# Patient Record
Sex: MALE | Race: WHITE | Employment: FULL TIME | URBAN - NONMETROPOLITAN AREA
[De-identification: names, ages, dates, MRNs, and addresses within clinical notes are randomized per-mention and may not be internally consistent; named-entity substitution may affect disease eponyms.]

---

## 2020-03-31 ENCOUNTER — HOSPITAL ENCOUNTER (EMERGENCY)
Age: 30
Discharge: HOME | End: 2020-03-31
Attending: EMERGENCY MEDICINE
Payer: COMMERCIAL

## 2020-03-31 ENCOUNTER — APPOINTMENT (OUTPATIENT)
Dept: CT IMAGING | Age: 30
End: 2020-03-31
Payer: COMMERCIAL

## 2020-03-31 VITALS
DIASTOLIC BLOOD PRESSURE: 86 MMHG | HEIGHT: 69 IN | TEMPERATURE: 98.3 F | BODY MASS INDEX: 30.51 KG/M2 | SYSTOLIC BLOOD PRESSURE: 144 MMHG | WEIGHT: 206 LBS | RESPIRATION RATE: 16 BRPM | OXYGEN SATURATION: 96 % | HEART RATE: 82 BPM

## 2020-03-31 LAB
A/G RATIO: 1.4 (ref 1.1–2.2)
ALBUMIN SERPL-MCNC: 4.8 G/DL (ref 3.4–5)
ALP BLD-CCNC: 60 U/L (ref 40–129)
ALT SERPL-CCNC: 38 U/L (ref 10–40)
AMORPHOUS: ABNORMAL /HPF
ANION GAP SERPL CALCULATED.3IONS-SCNC: 15 MMOL/L (ref 3–16)
AST SERPL-CCNC: 28 U/L (ref 15–37)
BACTERIA: ABNORMAL /HPF
BASOPHILS ABSOLUTE: 0.1 K/UL (ref 0–0.2)
BASOPHILS RELATIVE PERCENT: 0.3 %
BILIRUB SERPL-MCNC: 0.4 MG/DL (ref 0–1)
BILIRUBIN URINE: NEGATIVE
BLOOD, URINE: ABNORMAL
BUN BLDV-MCNC: 17 MG/DL (ref 7–20)
CALCIUM SERPL-MCNC: 10 MG/DL (ref 8.3–10.6)
CHLORIDE BLD-SCNC: 102 MMOL/L (ref 99–110)
CLARITY: CLEAR
CO2: 23 MMOL/L (ref 21–32)
COLOR: YELLOW
CREAT SERPL-MCNC: 1.1 MG/DL (ref 0.9–1.3)
EOSINOPHILS ABSOLUTE: 0.3 K/UL (ref 0–0.6)
EOSINOPHILS RELATIVE PERCENT: 1.4 %
EPITHELIAL CELLS, UA: ABNORMAL /HPF (ref 0–5)
GFR AFRICAN AMERICAN: >60
GFR NON-AFRICAN AMERICAN: >60
GLOBULIN: 3.5 G/DL
GLUCOSE BLD-MCNC: 168 MG/DL (ref 70–99)
GLUCOSE URINE: NEGATIVE MG/DL
HCT VFR BLD CALC: 45.9 % (ref 40.5–52.5)
HEMOGLOBIN: 15.6 G/DL (ref 13.5–17.5)
KETONES, URINE: 15 MG/DL
LEUKOCYTE ESTERASE, URINE: NEGATIVE
LYMPHOCYTES ABSOLUTE: 2.2 K/UL (ref 1–5.1)
LYMPHOCYTES RELATIVE PERCENT: 12.2 %
MCH RBC QN AUTO: 29.9 PG (ref 26–34)
MCHC RBC AUTO-ENTMCNC: 34 G/DL (ref 31–36)
MCV RBC AUTO: 88 FL (ref 80–100)
MICROSCOPIC EXAMINATION: YES
MONOCYTES ABSOLUTE: 1 K/UL (ref 0–1.3)
MONOCYTES RELATIVE PERCENT: 5.6 %
MUCUS: ABNORMAL /LPF
NEUTROPHILS ABSOLUTE: 14.3 K/UL (ref 1.7–7.7)
NEUTROPHILS RELATIVE PERCENT: 80.5 %
NITRITE, URINE: NEGATIVE
PDW BLD-RTO: 13.2 % (ref 12.4–15.4)
PH UA: 5 (ref 5–8)
PLATELET # BLD: 330 K/UL (ref 135–450)
PMV BLD AUTO: 7.2 FL (ref 5–10.5)
POTASSIUM SERPL-SCNC: 3.7 MMOL/L (ref 3.5–5.1)
PROTEIN UA: NEGATIVE MG/DL
RBC # BLD: 5.22 M/UL (ref 4.2–5.9)
RBC UA: ABNORMAL /HPF (ref 0–4)
SODIUM BLD-SCNC: 140 MMOL/L (ref 136–145)
SPECIFIC GRAVITY UA: >=1.03 (ref 1–1.03)
TOTAL PROTEIN: 8.3 G/DL (ref 6.4–8.2)
URINE TYPE: ABNORMAL
UROBILINOGEN, URINE: 0.2 E.U./DL
WBC # BLD: 17.8 K/UL (ref 4–11)
WBC UA: ABNORMAL /HPF (ref 0–5)

## 2020-03-31 PROCEDURE — 81001 URINALYSIS AUTO W/SCOPE: CPT

## 2020-03-31 PROCEDURE — 85025 COMPLETE CBC W/AUTO DIFF WBC: CPT

## 2020-03-31 PROCEDURE — 96374 THER/PROPH/DIAG INJ IV PUSH: CPT

## 2020-03-31 PROCEDURE — 2580000003 HC RX 258: Performed by: EMERGENCY MEDICINE

## 2020-03-31 PROCEDURE — 74176 CT ABD & PELVIS W/O CONTRAST: CPT

## 2020-03-31 PROCEDURE — 99284 EMERGENCY DEPT VISIT MOD MDM: CPT

## 2020-03-31 PROCEDURE — 6360000002 HC RX W HCPCS: Performed by: EMERGENCY MEDICINE

## 2020-03-31 PROCEDURE — 80053 COMPREHEN METABOLIC PANEL: CPT

## 2020-03-31 PROCEDURE — 96375 TX/PRO/DX INJ NEW DRUG ADDON: CPT

## 2020-03-31 PROCEDURE — 6370000000 HC RX 637 (ALT 250 FOR IP): Performed by: EMERGENCY MEDICINE

## 2020-03-31 PROCEDURE — 36415 COLL VENOUS BLD VENIPUNCTURE: CPT

## 2020-03-31 RX ORDER — 0.9 % SODIUM CHLORIDE 0.9 %
1000 INTRAVENOUS SOLUTION INTRAVENOUS ONCE
Status: COMPLETED | OUTPATIENT
Start: 2020-03-31 | End: 2020-03-31

## 2020-03-31 RX ORDER — TAMSULOSIN HYDROCHLORIDE 0.4 MG/1
0.4 CAPSULE ORAL ONCE
Status: COMPLETED | OUTPATIENT
Start: 2020-03-31 | End: 2020-03-31

## 2020-03-31 RX ORDER — IBUPROFEN 600 MG/1
600 TABLET ORAL EVERY 6 HOURS PRN
Qty: 28 TABLET | Refills: 0 | Status: SHIPPED | OUTPATIENT
Start: 2020-03-31 | End: 2020-04-07

## 2020-03-31 RX ORDER — HYDROCODONE BITARTRATE AND ACETAMINOPHEN 5; 325 MG/1; MG/1
1 TABLET ORAL EVERY 6 HOURS PRN
Qty: 5 TABLET | Refills: 0 | Status: SHIPPED | OUTPATIENT
Start: 2020-03-31 | End: 2020-04-02

## 2020-03-31 RX ORDER — ACETAMINOPHEN 500 MG
500 TABLET ORAL EVERY 6 HOURS PRN
Qty: 20 TABLET | Refills: 0 | Status: SHIPPED | OUTPATIENT
Start: 2020-03-31

## 2020-03-31 RX ORDER — SODIUM CHLORIDE 9 MG/ML
INJECTION, SOLUTION INTRAVENOUS CONTINUOUS
Status: DISCONTINUED | OUTPATIENT
Start: 2020-03-31 | End: 2020-03-31 | Stop reason: HOSPADM

## 2020-03-31 RX ORDER — ONDANSETRON 2 MG/ML
4 INJECTION INTRAMUSCULAR; INTRAVENOUS ONCE
Status: COMPLETED | OUTPATIENT
Start: 2020-03-31 | End: 2020-03-31

## 2020-03-31 RX ORDER — ONDANSETRON 4 MG/1
4 TABLET, ORALLY DISINTEGRATING ORAL EVERY 8 HOURS PRN
Qty: 20 TABLET | Refills: 0 | Status: SHIPPED | OUTPATIENT
Start: 2020-03-31 | End: 2020-04-07

## 2020-03-31 RX ORDER — KETOROLAC TROMETHAMINE 30 MG/ML
30 INJECTION, SOLUTION INTRAMUSCULAR; INTRAVENOUS ONCE
Status: COMPLETED | OUTPATIENT
Start: 2020-03-31 | End: 2020-03-31

## 2020-03-31 RX ORDER — TAMSULOSIN HYDROCHLORIDE 0.4 MG/1
0.4 CAPSULE ORAL DAILY
Qty: 5 CAPSULE | Refills: 0 | Status: SHIPPED | OUTPATIENT
Start: 2020-03-31 | End: 2020-04-05

## 2020-03-31 RX ADMIN — HYDROMORPHONE HYDROCHLORIDE 1 MG: 1 INJECTION, SOLUTION INTRAMUSCULAR; INTRAVENOUS; SUBCUTANEOUS at 08:29

## 2020-03-31 RX ADMIN — KETOROLAC TROMETHAMINE 30 MG: 30 INJECTION, SOLUTION INTRAMUSCULAR at 08:34

## 2020-03-31 RX ADMIN — ONDANSETRON HYDROCHLORIDE 4 MG: 2 INJECTION, SOLUTION INTRAMUSCULAR; INTRAVENOUS at 08:29

## 2020-03-31 RX ADMIN — TAMSULOSIN HYDROCHLORIDE 0.4 MG: 0.4 CAPSULE ORAL at 08:43

## 2020-03-31 RX ADMIN — SODIUM CHLORIDE 1000 ML: 9 INJECTION, SOLUTION INTRAVENOUS at 08:29

## 2020-03-31 SDOH — HEALTH STABILITY: MENTAL HEALTH: HOW OFTEN DO YOU HAVE A DRINK CONTAINING ALCOHOL?: NEVER

## 2020-03-31 ASSESSMENT — PAIN SCALES - GENERAL
PAINLEVEL_OUTOF10: 10
PAINLEVEL_OUTOF10: 8
PAINLEVEL_OUTOF10: 8

## 2020-03-31 ASSESSMENT — ENCOUNTER SYMPTOMS
ABDOMINAL PAIN: 0
BACK PAIN: 0
SHORTNESS OF BREATH: 0

## 2020-03-31 ASSESSMENT — PAIN DESCRIPTION - PAIN TYPE: TYPE: ACUTE PAIN

## 2020-03-31 ASSESSMENT — PAIN DESCRIPTION - ORIENTATION: ORIENTATION: MID;LOWER

## 2020-03-31 ASSESSMENT — PAIN DESCRIPTION - DESCRIPTORS: DESCRIPTORS: STABBING

## 2020-03-31 ASSESSMENT — PAIN DESCRIPTION - LOCATION: LOCATION: ABDOMEN;PELVIS

## 2020-03-31 NOTE — ED PROVIDER NOTES
[address] [facility] Name: Geno Rick  MRN: [id number]  [place] 1990  Date of evaluation: 3/31/2020  Provider: Shayan Newton MD    [address] [place] [address]       Chief Complaint   Patient presents with    Flank Pain     since 0515         HISTORY OF PRESENT ILLNESS   (Location/Symptom, Timing/Onset, Context/Setting, Quality, Duration, Modifying Factors, Severity)  Note limiting factors. Geno Rick is a 26 y.o. male who presents to the emergency department     Patient was suddenly awakened at 5 AM with severe right flank pain. Patient states that he is nauseated. He is never had pain like this before it is 10/10 it is into the right flank radiating around to the right groin. He has apparently father had some kidney stones but he himself is never had one. He denies other medical problems including blood thinners etc.  He denies any other history he has no tenderness. The history is provided by the patient. Abdominal Pain   Pain location:  R flank  Pain quality: sharp and stabbing    Pain radiates to:  R flank  Onset quality:  Sudden  Timing:  Constant  Progression:  Worsening  Chronicity:  New  Context: awakening from sleep    Context: not previous surgeries    Associated symptoms: no chest pain, no chills, no fever, no hematuria and no shortness of breath        Nursing Notes were reviewed. REVIEW OF SYSTEMS    (2-9 systems for level 4, 10 or more for level 5)     Review of Systems   Constitutional: Positive for activity change. Negative for chills and fever. Respiratory: Negative for shortness of breath. Cardiovascular: Negative for chest pain, palpitations and leg swelling. Gastrointestinal: Negative for abdominal pain. Genitourinary: Positive for difficulty urinating and flank pain. Negative for decreased urine volume, frequency, hematuria, testicular pain and urgency.    Musculoskeletal: Negative for back pain and neck MEDICATIONS:  New Prescriptions    ACETAMINOPHEN (APAP EXTRA STRENGTH) 500 MG TABLET    Take 1 tablet by mouth every 6 hours as needed for Pain    HYDROCODONE-ACETAMINOPHEN (NORCO) 5-325 MG PER TABLET    Take 1 tablet by mouth every 6 hours as needed for Pain for up to 5 doses. IBUPROFEN (ADVIL;MOTRIN) 600 MG TABLET    Take 1 tablet by mouth every 6 hours as needed for Pain    ONDANSETRON (ZOFRAN-ODT) 4 MG DISINTEGRATING TABLET    Place 1 tablet under the tongue every 8 hours as needed for Nausea or Vomiting May Sub regular tablet (non-ODT) if insurance does not cover ODT. TAMSULOSIN (FLOMAX) 0.4 MG CAPSULE    Take 1 capsule by mouth daily for 5 days       Controlled Substances Monitoring  No flowsheet data found. (Please note that portions of this note were completed with a voice recognition program.  Efforts were made to edit the dictations but occasionally words are mis-transcribed.)    Patient was advised to return to the Emergency Department if there was any worsening.     Nia Grubbs MD (electronically signed)  Attending Emergency Physician         Brittany Luna MD  03/31/20 2251

## 2023-12-31 ENCOUNTER — HOSPITAL ENCOUNTER (EMERGENCY)
Age: 33
Discharge: HOME | End: 2023-12-31
Payer: COMMERCIAL

## 2023-12-31 VITALS — BODY MASS INDEX: 33.6 KG/M2

## 2023-12-31 VITALS
HEART RATE: 74 BPM | OXYGEN SATURATION: 98 % | RESPIRATION RATE: 18 BRPM | TEMPERATURE: 97.6 F | DIASTOLIC BLOOD PRESSURE: 96 MMHG | SYSTOLIC BLOOD PRESSURE: 141 MMHG

## 2023-12-31 DIAGNOSIS — R10.13: Primary | ICD-10-CM

## 2023-12-31 LAB
ALANINE AMINOTRANSFER ALT/SGPT: 67 U/L (ref 16–61)
ALBUMIN SERPL-MCNC: 3.6 G/DL (ref 3.2–5)
ALKALINE PHOSPHATASE: 56 U/L (ref 45–117)
ANION GAP: 3 (ref 5–15)
AST(SGOT): 31 U/L (ref 15–37)
BILIRUB DIRECT SERPL-MCNC: 0.08 MG/DL (ref 0–0.3)
BUN SERPL-MCNC: 12 MG/DL (ref 7–18)
BUN/CREAT RATIO: 12.1 RATIO (ref 10–20)
CALCIUM SERPL-MCNC: 8.9 MG/DL (ref 8.5–10.1)
CARBON DIOXIDE: 25 MMOL/L (ref 21–32)
CHLORIDE: 111 MMOL/L (ref 98–107)
DEPRECATED RDW RBC: 41.5 FL (ref 35.1–43.9)
ERYTHROCYTE [DISTWIDTH] IN BLOOD: 13.2 % (ref 11.6–14.6)
EST GLOM FILT RATE - AFR AMER: 111 ML/MIN (ref 60–?)
ESTIMATED CREATININE CLEARANCE: 105.14 ML/MIN
GLOBULIN: 3.9 G/DL (ref 2.2–4.2)
GLUCOSE: 97 MG/DL (ref 74–106)
HCT VFR BLD AUTO: 45.7 % (ref 40–54)
HGB BLD-MCNC: 16 G/DL (ref 13–16.5)
IMMATURE GRANULOCYTES COUNT: 0.02 X10^3/UL (ref 0–0)
LIPASE: 30 U/L (ref 13–75)
MCV RBC: 86.6 FL (ref 80–94)
MEAN CORP HGB CONC: 35 G/DL (ref 32–36)
MEAN PLATELET VOL.: 8.9 FL (ref 6.2–12)
NRBC FLAGGED BY ANALYZER: 0 % (ref 0–5)
PLATELET # BLD: 301 K/MM3 (ref 150–450)
POTASSIUM: 4.1 MMOL/L (ref 3.5–5.1)
RBC # BLD AUTO: 5.28 M/MM3 (ref 4.6–6.2)
TROPONIN-I HS: 4 PG/ML (ref 3–78)
WBC # BLD AUTO: 7.9 K/MM3 (ref 4.4–11)

## 2023-12-31 PROCEDURE — 93005 ELECTROCARDIOGRAM TRACING: CPT

## 2023-12-31 PROCEDURE — A4216 STERILE WATER/SALINE, 10 ML: HCPCS

## 2023-12-31 PROCEDURE — 85025 COMPLETE CBC W/AUTO DIFF WBC: CPT

## 2023-12-31 PROCEDURE — 96365 THER/PROPH/DIAG IV INF INIT: CPT

## 2023-12-31 PROCEDURE — 80076 HEPATIC FUNCTION PANEL: CPT

## 2023-12-31 PROCEDURE — 80048 BASIC METABOLIC PNL TOTAL CA: CPT

## 2023-12-31 PROCEDURE — 99284 EMERGENCY DEPT VISIT MOD MDM: CPT

## 2023-12-31 PROCEDURE — 84484 ASSAY OF TROPONIN QUANT: CPT

## 2023-12-31 PROCEDURE — 83690 ASSAY OF LIPASE: CPT

## 2023-12-31 NOTE — XMS RPT_ITS
Comprehensive CCD (C-CDA v2.1)  
  
                          Created on: 2023  
  
  
Hamzah Trevizo  
External Reference #: [id number]  
: 1990  
Sex: Male  
  
Demographics  
  
  
                                        Address             [address] [place]  [address]  
   
                                        Home Phone          [phone]  
   
                                        Home Phone          [phone]  
   
                                        Preferred Language  en  
   
                                        Marital Status        
   
                                        Denominational Affiliation Unknown  
   
                                        Race                Unknown  
   
                                        Ethnic Group        Not  or Lati  
no  
  
  
Author  
  
  
                                        Name                Unknown  
   
                                        Address             [address]  
[address]  
[place]  [address]  
   
                                        Phone               [phone]  
   
                                        Organization        [facility]  
  
  
Care Team Providers  
  
  
                                Care Team Member Name Role            Phone  
   
                                Naomi Moore Primary Care Provider [phone]  
[phone]  
   
                                MD Naomi Moore Attending Provider [phone]  
   
                                MD Naomi Moore Primary Care Provider [phone]   
   
                                AMRITA Ryder Attending Provider [phone]  
[phone]  
   
                                AMRITA Ryder Other Provider  [phone]  
   
                                MD Alex Payne Attending Provider [phone]  
   
                                MARILYN Lopez Attending Provider [phone]  
   
                                MD Naomi Moore Primary Care Provider [phone]   
   
                                AMRITA Ryder Attending Provider [phone]  
[phone]  
   
                                MARILYN Lopez Attending Provider [phone]  
   
                                MARY Gutierrez Attending Provider [phone]  
   
                                July, MARILYN Goodson Attending Provider [phone]  
   
                                MD Naomi Moore Primary Care Provider [phone]   
   
                                July, MARILYN Goodson Attending Provider [phone]  
   
                                MD Naomi Moore Attending Provider [phone]  
   
                                July, Hamzah   Attending       Unavailable  
   
                                Naomi Moore Attending       Unavailable  
   
                                Naomi Moore Attending       Unavailable  
  
  
  
Allergies  
  
  
                                                    Allergy   
Classification                          Reported   
Allergen(s)               Allergy Type              Date of   
Onset                     Reaction(s)               Facility  
   
                                                      
(2 sources)               Penicillins               Propensity to   
adverse   
reactions to   
drug                                      
0                         Rash                      [place]  
   
                                                      
(3 sources)               Penicillins               Allergy to   
substance                                 
3                         Rash                      [facility]  
   
                                                      
(1 source)                Penicillins               Drug allergy   
(disorder)                              10-  
3                                                   [facility]   
[facility]   
Repository  
  
  
  
Medications  
Current Medications  
  
  
  
                      Medication Drug Class(es) Dates      Sig (Normalized) Sig   
(Original)  
   
                                                    acetaminophen 500 mg   
oral tablet  
(1 source)                                          Start:   
2020                              take 1 tablet by   
mouth every six   
hours as needed for   
pain                                    acetaminophen (APAP   
EXTRA STRENGTH) 500   
MG tablet Take 1   
tablet by mouth   
every 6 hours as   
needed for Pain 20   
tablet 0 2020   
Active  
  
  
  
                                          
   
                                          
   
                                          
   
                                          
   
                                          
   
                                          
   
                                          
   
                                          
   
                                          
   
                                          
   
                                          
   
                                          
   
                                          
   
                                          
   
                                          
   
                                          
   
                                          
   
                                          
   
                                          
   
                                          
   
                                          
   
                                          
   
                                          
   
                                          
  
  
  
Completed/Discontinued Medications  
  
  
  
                      Medication Drug Class(es) Dates      Sig (Normalized) Sig   
(Original)  
   
                                                    12 hr cetirizine   
hydrochloride 5 mg /   
pseudoephedrine   
hydrochloride 120 mg   
extended release   
oral tablet  
(3 sources)                             alpha-Adrenergic   
Agonist,   
Histamine-1   
Receptor Antagonist                     Start:   
2023  
End:   
10-                              take 1 tablet by   
mouth once, then   
take 1 tablet by   
mouth every   
twelve hours                            Cetirizine-Pseudoep  
hedrine (Zyrtec-D)   
5-120 mg tablet   
extended release 12   
hr Discontinued 1   
TAB ORAL ONCE    
1:00am 2023 8:23am  
   
                                                    cyclobenzaprine   
hydrochloride 10 mg   
oral tablet  
(4 sources)               Muscle Relaxant           Start:   
2022  
End:   
2023                              take 10 mg by   
mouth every eight   
hours                                   Cyclobenzaprine   
Discontinued 10 MG   
ORAL Q8H 20 May   
27th, 2022 12:00am   
2023   
9:32am  
   
                                                    dexamethasone 1   
mg/ml / neomycin 3.5   
mg/ml / polymyxin b   
85864 unt/ml   
ophthalmic   
suspension  
(3 sources)                             Aminoglycoside   
Antibacterial,   
Polymyxin-class   
Antibacterial,   
Corticosteroid                          Start:   
2022  
End:   
2023                              take 1 drop(s)   
into the eye(s)   
every twelve   
hours                                   Neomycin-Polymyxin   
B-Dexameth   
Discontinued 1 DROP   
EYE-RIGHT Q12H 5 7   
2022   
12:00am 2023 11:48am  
   
                                                    doxycycline hyclate   
100 mg oral tablet  
(4 sources)                             Tetracycline-class   
Drug                                    Start:   
2022  
End:   
2022                              take 100 mg by   
mouth twice daily                       Doxycycline   
Monohydrate   
Discontinued 100 MG   
ORAL TWICE A DAY 14   
7 2022   
5:33pm May 27th,   
2022 8:15am  
  
  
  
                                          
   
                                          
   
                                          
   
                                          
   
                                          
   
                                          
   
                                          
   
                                          
   
                                          
   
                                          
  
  
  
Problems  
Active Problems  
  
  
                                                    Problem   
Classification  Problem         Date            Documented Date Episodic/Chronic  
   
                                                    Esophageal disorders  
(6 sources)                             Gastroesophageal   
reflux disease;   
Translations:   
[Gastro-esophageal   
reflux disease without   
esophagitis]                            10-          Chronic  
   
                                                    Inflammation;   
infection of eye   
(except that caused   
by tuberculosis or   
sexually   
transmitteddisease)  
(1 source)                              Unspecified   
conjunctivitis;   
Translations: [Other   
conjunctivitis]                                             Episodic  
   
                                                    Other connective   
tissue disease  
(1 source)                              Myalgia, unspecified   
site; Translations:   
[Myalgia and myositis,   
unspecified]                                                Episodic  
   
                                                    Other endocrine   
disorders  
(4 sources)                             Hypoglycemia;   
Translations:   
[Hypoglycemia,   
unspecified]                            Onset:   
2021                Chronic  
   
                                                    Other endocrine   
disorders  
(3 sources)                             Hypoglycemia,   
unspecified;   
Translations:   
[Hypoglycemia,   
unspecified]                            Onset:   
2021                                          Chronic  
   
                                                    Other eye disorders  
(1 source)                              Unspecified disorder   
of eye and adnexa;   
Translations: [Other   
ill-defined disorders   
of eye]                                                     Episodic  
   
                                                    Other lower   
respiratory disease  
(1 source)                              Cough; Translations:   
[New onset cough]                                           Episodic  
   
                                                    Other male genital   
disorders  
(4 sources)                             Male erectile   
dysfunction,   
unspecified;   
Translations:   
[Erectile dysfunction]                     10-          Chronic  
   
                                                    Other nutritional;   
endocrine; and   
metabolic disorders  
(2 sources)                             Obesity; Translations:   
[Obesity, unspecified]                     10-          Chronic  
   
                                                    Other nutritional;   
endocrine; and   
metabolic disorders  
(2 sources)                             Obesity, unspecified;   
Translations:   
[Obesity, unspecified]                     10-          Chronic  
   
                                                    Other upper   
respiratory disease  
(4 sources)                             Allergic rhinitis;   
Translations:   
[Allergic rhinitis,   
unspecified]                            10-          Chronic  
   
                                                    Other upper   
respiratory disease  
(5 sources)                             Allergic rhinitis,   
unspecified;   
Translations:   
[Allergic rhinitis,   
cause unspecified]                                          Chronic  
   
                                                    Otitis media and   
related conditions  
(1 source)                              Otitis media,   
unspecified,   
unspecified ear;   
Translations:   
[Unspecified otitis   
media]                                                      Episodic  
  
  
Past or Other Problems  
  
  
                      Problem Classification Problem    Date       Documented Da  
te Episodic/Chronic  
   
                                                    Calculus of urinary   
tract  
(8 sources)                             Kidney stone;   
Translations:   
[History of   
calculus of   
kidney]                                 Onset:   
2020                                          Episodic  
  
  
  
Results  
  
  
                      Test Name  Value      Interpretation Reference Range Facil  
ity  
  
  
  
                                          
   
                                          
   
                                          
   
                                          
   
                                          
   
                                          
   
                                          
   
                                          
   
                                          
   
                                          
   
                                          
   
                                          
   
                                          
   
                                          
   
                                          
   
                                          
   
                                          
   
                                          
   
                                          
   
                                          
   
                                          
   
                                          
   
                                          
   
                                          
   
                                          
   
                                          
   
                                          
   
                                          
   
                                          
   
                                          
   
                                          
   
                                          
   
                                          
   
                                          
   
                                          
   
                                          
   
                                          
   
                                          
   
                                          
   
                                          
   
                                          
   
                                          
   
                                          
   
                                          
   
                                          
   
                                          
   
                                          
   
                                          
   
                                          
   
                                          
   
                                          
   
                                          
   
                                          
   
                                          
   
                                          
   
                                          
   
                                          
   
                                          
   
                                          
   
                                          
   
                                          
   
                                          
   
                                          
   
                                          
   
                                          
   
                                          
   
                                          
   
                                          
   
                                          
   
                                          
   
                                          
   
                                          
   
                                          
   
                                          
   
                                          
   
                                          
   
                                          
   
                                          
   
                                          
   
                                          
   
                                          
   
                                          
   
                                          
   
                                          
   
                                          
   
                                          
   
                                          
   
                                          
   
                                          
   
                                          
   
                                          
   
                                          
   
                                          
   
                                          
   
                                          
   
                                          
   
                                          
   
                                          
   
                                          
   
                                          
   
                                          
   
                                          
   
                                          
   
                                          
   
                                          
   
                                          
   
                                          
   
                                          
   
                                          
   
                                          
   
                                          
   
                                          
   
                                          
   
                                          
   
                                          
   
                                          
   
                                          
   
                                          
   
                                          
   
                                          
   
                                          
   
                                          
   
                                          
   
                                          
   
                                          
   
                                          
   
                                          
   
                                          
   
                                          
   
                                          
   
                                          
   
                                          
   
                                          
   
                                          
   
                                          
   
                                          
   
                                          
   
                                          
   
                                          
   
                                          
   
                                          
   
                                          
   
                                          
   
                                          
   
                                          
   
                                          
   
                                          
   
                                          
   
                                          
   
                                          
   
                                          
   
                                          
   
                                          
   
                                          
   
                                          
   
                                          
   
                                          
   
                                          
   
                                          
   
                                          
   
                                          
   
                                          
   
                                          
   
                                          
   
                                          
   
                                          
   
                                          
   
                                          
   
                                          
   
                                          
   
                                          
   
                                          
   
                                          
   
                                          
   
                                          
   
                                          
   
                                          
   
                                          
   
                                          
   
                                          
   
                                          
   
                                          
   
                                          
   
                                          
   
                                          
   
                                          
   
                                          
   
                                          
   
                                          
   
                                          
   
                                          
   
                                          
   
                                          
   
                                          
   
                                          
   
                                          
   
                                          
   
                                          
   
                                          
   
                                          
   
                                          
   
                                          
   
                                          
   
                                          
   
                                          
   
                                          
   
                                          
   
                                          
   
                                          
   
                                          
   
                                          
   
                                          
   
                                          
   
                                          
   
                                          
   
                                          
   
                                          
   
                                          
   
                                          
   
                                          
   
                                          
   
                                          
   
                                          
   
                                          
   
                                          
   
                                          
   
                                          
   
                                          
   
                                          
   
                                          
   
                                          
   
                                          
   
                                          
   
                                          
   
                                          
   
                                          
   
                                          
   
                                          
   
                                          
   
                                          
   
                                          
   
                                          
   
                                          
   
                                          
   
                                          
   
                                          
   
                                          
   
                                          
   
                                          
   
                                          
   
                                          
   
                                          
   
                                          
   
                                          
   
                                          
   
                                          
   
                                          
   
                                          
   
                                          
   
                                          
   
                                          
   
                                          
   
                                          
   
                                          
   
                                          
   
                                          
   
                                          
   
                                          
   
                                          
   
                                          
   
                                          
   
                                          
   
                                          
   
                                          
   
                                          
   
                                          
   
                                          
   
                                          
   
                                          
   
                                          
   
                                          
   
                                          
   
                                          
   
                                          
   
                                          
   
                                          
   
                                          
   
                                          
   
                                          
   
                                          
   
                                          
   
                                          
   
                                          
   
                                          
   
                                          
   
                                          
   
                                          
   
                                          
   
                                          
   
                                          
   
                                          
   
                                          
   
                                          
   
                                          
   
                                          
   
                                          
   
                                          
   
                                          
   
                                          
   
                                          
   
                                          
   
                                          
   
                                          
   
                                          
   
                                          
   
                                          
   
                                          
   
                                          
   
                                          
   
                                          
   
                                          
   
                                          
   
                                          
   
                                          
   
                                          
   
                                          
   
                                          
   
                                          
   
                                          
   
                                          
   
                                          
   
                                          
   
                                          
   
                                          
   
                                          
   
                                          
   
                                          
   
                                          
   
                                          
   
                                          
   
                                          
   
                                          
   
                                          
   
                                          
   
                                          
   
                                          
   
                                          
   
                                          
   
                                          
   
                                          
   
                                          
   
                                          
   
                                          
   
                                          
   
                                          
   
                                          
   
                                          
   
                                          
   
                                          
   
                                          
   
                                          
   
                                          
   
                                          
   
                                          
   
                                          
   
                                          
   
                                          
   
                                          
   
                                          
   
                                          
   
                                          
   
                                          
   
                                          
   
                                          
   
                                          
   
                                          
   
                                          
   
                                          
   
                                          
   
                                          
   
                                          
   
                                          
   
                                          
   
                                          
   
                                          
   
                                          
   
                                          
   
                                          
   
                                          
   
                                          
   
                                          
   
                                          
   
                                          
   
                                          
   
                                          
   
                                          
   
                                          
   
                                          
   
                                          
   
                                          
   
                                          
   
                                          
   
                                          
   
                                          
   
                                          
   
                                          
   
                                          
   
                                          
   
                                          
   
                                          
   
                                          
   
                                          
   
                                          
   
                                          
   
                                          
   
                                          
   
                                          
   
                                          
   
                                          
   
                                          
   
                                          
   
                                          
   
                                          
   
                                          
   
                                          
   
                                          
   
                                          
   
                                          
   
                                          
   
                                          
   
                                          
   
                                          
   
                                          
   
                                          
   
                                          
   
                                          
   
                                          
   
                                          
   
                                          
   
                                          
   
                                          
   
                                          
   
                                          
   
                                          
   
                                          
   
                                          
   
                                          
   
                                          
   
                                          
   
                                          
   
                                          
   
                                          
   
                                          
   
                                          
   
                                          
   
                                          
   
                                          
   
                                          
   
                                          
   
                                          
   
                                          
   
                                          
   
                                          
   
                                          
   
                                          
   
                                          
   
                                          
   
                                          
   
                                          
   
                                          
   
                                          
   
                                          
   
                                          
   
                                          
   
                                          
   
                                          
   
                                          
   
                                          
   
                                          
   
                                          
   
                                          
   
                                          
   
                                          
   
                                          
   
                                          
   
                                          
   
                                          
   
                                          
   
                                          
   
                                          
   
                                          
   
                                          
   
                                          
   
                                          
   
                                          
   
                                          
   
                                          
   
                                          
   
                                          
   
                                          
   
                                          
   
                                          
   
                                          
   
                                          
   
                                          
   
                                          
   
                                          
   
                                          
   
                                          
   
                                          
   
                                          
   
                                          
   
                                          
   
                                          
   
                                          
   
                                          
   
                                          
   
                                          
   
                                          
   
                                          
   
                                          
   
                                          
   
                                          
   
                                          
   
                                          
   
                                          
   
                                          
   
                                          
   
                                          
   
                                          
   
                                          
   
                                          
   
                                          
   
                                          
   
                                          
   
                                          
   
                                          
   
                                          
   
                                          
   
                                          
   
                                          
   
                                          
   
                                          
  
  
  
Vital Signs  
  
  
                          Date Time    Vital Sign   Value        Performing   
Clinician                               Facility  
   
                                                    10-   
08:          Body height         175 cm              MD Naomi Moore  
Work Phone:   
[phone]                          [facility]  
   
                                                    10-   
08:                              Body mass index   
(BMI) [Ratio]             32.6 kg/m2                MD Naomi Moore  
Work Phone:   
[phone]                          [facility]  
   
                                                    10-   
08:          Body temperature    98.7 [degF]         MD Naomi Moore  
Work Phone:   
[phone]                          [facility]  
   
                                                    10-   
08:          Body weight         100 kg              MD Naomi Moore  
Work Phone:   
[phone]                          [facility]  
   
                                                    10-   
08:                              Diastolic blood   
pressure                  73 mm[Hg]                 MD Naomi Moore  
Work Phone:   
[phone]                          [facility]  
   
                                                    10-   
08:          Heart rate          79 /min             MD Naomi Moore  
Work Phone:   
[phone]                          [facility]  
   
                                                    10-   
08:          Respiratory rate    17 /min             MD Naomi Moore  
Work Phone:   
[phone]                          [facility]  
   
                                                    10-   
08:                              SaO2% (BldA) [Mass   
fraction]                 96 %                      MD Naomi Moore  
Work Phone:   
[phone]                          [facility]  
   
                                                    10-   
08:                              Systolic blood   
pressure                  116 mm[Hg]                MD Naomi Moore  
Work Phone:   
[phone]                          [facility]  
   
                                                    2023   
08:          Body height         175 cm              MD Naomi Moore  
Work Phone:   
[phone]                          [facility]  
   
                                                    2023   
08:                              Body mass index   
(BMI) [Ratio]             32.2 kg/m2                MD Naomi Moore  
Work Phone:   
[phone]                          [facility]  
   
                                                    2023   
08:          Body temperature    98.1 [degF]         MD Naomi Moore  
Work Phone:   
[phone]                          [facility]  
   
                                                    2023   
08:          Body weight         98.8 kg             MD Naomi Moore  
Work Phone:   
[phone]                          [facility]  
   
                                                    2023   
08:                              Diastolic blood   
pressure                  71 mm[Hg]                 MD Naomi Moore  
Work Phone:   
[phone]                          [facility]  
   
                                                    2023   
08:          Heart rate          68 /min             MD Naomi Moore  
Work Phone:   
[phone]                          [facility]  
   
                                                    2023   
08:          Respiratory rate    18 /min             MD Naomi Moore  
Work Phone:   
[phone]                          [facility]  
   
                                                    2023   
08:                              SaO2% (BldA) [Mass   
fraction]                 96 %                      MD Naomi Moore  
Work Phone:   
[phone]                          [facility]  
   
                                                    2023   
08:                              Systolic blood   
pressure                  127 mm[Hg]                MD Naomi Moore  
Work Phone:   
[phone]                          [facility]  
   
                                                    2022   
08:          Body height         175.01 cm           MD Naomi Moore  
Work Phone:   
[phone]                          [facility]   
Ambulatory  
Work Phone:   
[phone]  
   
                                                    2022   
08:                              Body mass index   
(BMI) [Ratio]             31.1 kg/m2                MD Naomi Moore  
Work Phone:   
[phone]                          [facility]   
Ambulatory  
Work Phone:   
[phone]  
   
                                                    2022   
08:          Body temperature    98.4 [degF]         MD Naomi Moore  
Work Phone:   
[phone]                          [facility]   
Ambulatory  
Work Phone:   
[phone]  
   
                                                    2022   
08:          Body weight         95.2 kg             MD Naomi Moore  
Work Phone:   
[phone]                          [facility]   
Ambulatory  
Work Phone:   
[phone]  
   
                                                    2022   
08:                              Diastolic blood   
pressure                  76 mm[Hg]                 MD Naomi Moore  
Work Phone:   
[phone]                          [facility]   
Ambulatory  
Work Phone:   
[phone]  
   
                                                    2022   
08:          Heart rate          67 /min             MD Naomi Moore  
Work Phone:   
[phone]                          [facility]   
Ambulatory  
Work Phone:   
[phone]   
08:          Respiratory rate    18 /min             MD Naomi Moore  
Work Phone:   
[phone]                          [facility]   
Ambulatory  
Work Phone:   
[phone]   
08:                              SaO2% (BldA) [Mass   
fraction]                 96 %                      MD Naomi Moore  
Work Phone:   
[phone]                          [facility]   
Ambulatory  
Work Phone:   
[phone]   
08:                              Systolic blood   
pressure                  116 mm[Hg]                MD Naomi Moore  
Work Phone:   
[phone]                          [facility]   
Ambulatory  
Work Phone:   
[phone]   
08:          Body height         175 cm              MD Naomi Moore  
Work Phone:   
[phone]                          [facility]   
Ambulatory  
Work Phone:   
[phone]   
08:                              Body mass index   
(BMI) [Ratio]             31.3 kg/m2                MD Naomi Moore  
Work Phone:   
[phone]                          [facility]   
Ambulatory  
Work Phone:   
[phone]   
08:          Body temperature    98.9 [degF]         MD Naomi Moore  
Work Phone:   
[phone]                          [facility]   
Ambulatory  
Work Phone:   
[phone]   
08:          Body weight         95.9 kg             MD Naomi Moore  
Work Phone:   
[phone]                          [facility]   
Ambulatory  
Work Phone:   
[phone]   
08:                              Diastolic blood   
pressure                  86 mm[Hg]                 MD Naomi Moore  
Work Phone:   
[phone]                          [facility]   
Ambulatory  
Work Phone:   
[phone]   
08:          Heart rate          92 /min             MD Naomi Moore  
Work Phone:   
[phone]                          [facility]   
Ambulatory  
Work Phone:   
[phone]   
08:          Respiratory rate    18 /min             MD Naomi Moore  
Work Phone:   
[phone]                          [facility]   
Ambulatory  
Work Phone:   
[phone]   
08:                              SaO2% (BldA) [Mass   
fraction]                 98 %                      MD Naomi Moore  
Work Phone:   
[phone]                          [facility]   
Ambulatory  
Work Phone:   
[phone]   
08:                              Systolic blood   
pressure                  123 mm[Hg]                MD Naomi Moore  
Work Phone:   
[phone]                          [facility]   
Ambulatory  
Work Phone:   
[phone]   
16:          Body height         175.26 cm           MD Naomi Moore  
Work Phone:   
[phone]                          [facility]   
Ambulatory  
Work Phone:   
[phone]   
16:                              Body mass index   
(BMI) [Ratio]             31.9 kg/m2                MD Naomi Moore  
Work Phone:   
[phone]                          [facility]   
Ambulatory  
Work Phone:   
[phone]   
16:          Body temperature    98.5 [degF]         MD Naomi Moore  
Work Phone:   
[phone]                          [facility]   
Ambulatory  
Work Phone:   
[phone]   
16:          Body weight         98.2 kg             MD Naomi Moore  
Work Phone:   
[phone]                          [facility]   
Ambulatory  
Work Phone:   
[phone]   
16:                              Diastolic blood   
pressure                  92 mm[Hg]                 MD Naomi Moore  
Work Phone:   
[phone]                          [facility]   
Ambulatory  
Work Phone:   
[phone]   
16:          Heart rate          77 /min             MD Naomi Moore  
Work Phone:   
[phone]                          [facility]   
Ambulatory  
Work Phone:   
[phone]   
16:          Respiratory rate    16 /min             MD Naomi Moore  
Work Phone:   
[phone]                          [facility]   
Ambulatory  
Work Phone:   
[phone]   
16:                              SaO2% (BldA) [Mass   
fraction]                 96 %                      MD Naomi Moore  
Work Phone:   
[phone]                          [facility]   
Ambulatory  
Work Phone:   
[phone]   
16:                              Systolic blood   
pressure                  142 mm[Hg]                MD Naomi Moore  
Work Phone:   
[phone]                          [facility]   
Ambulatory  
Work Phone:   
[phone]   
08:          Body height         175 cm              MD Naomi Moore  
Work Phone:   
[phone]                          [facility]   
Ambulatory  
Work Phone:   
[phone]   
08:                              Body mass index   
(BMI) [Ratio]             32 kg/m2                  MD Naomi Moore  
Work Phone:   
[phone]                          [facility]   
Ambulatory  
Work Phone:   
[phone]   
08:          Body temperature    97.9 [degF]         MD Naomi Moore  
Work Phone:   
[phone]                          [facility]   
Ambulatory  
Work Phone:   
[phone]   
08:          Body weight         98 kg               MD Naomi Moore  
Work Phone:   
[phone]                          [facility]   
Ambulatory  
Work Phone:   
[phone]   
08:                              Diastolic blood   
pressure                  85 mm[Hg]                 MD Naomi Moore  
Work Phone:   
[phone]                          [facility]   
Ambulatory  
Work Phone:   
[phone]   
08:          Heart rate          62 /min             MD Naomi Moore  
Work Phone:   
[phone]                          [facility]   
Ambulatory  
Work Phone:   
[phone]   
08:          Respiratory rate    16 /min             MD Naomi Shupert  
Work Phone:   
[phone]                          [facility]   
Ambulatory  
Work Phone:   
[phone]   
08:                              SaO2% (BldA) [Mass   
fraction]                 98 %                      MD Naomi Moore  
Work Phone:   
[phone]                          [facility]   
Ambulatory  
Work Phone:   
[phone]   
08:                              Systolic blood   
pressure                  127 mm[Hg]                MD Naomi Moore  
Work Phone:   
[phone]                          [facility]   
Ambulatory  
Work Phone:   
[phone]   
14:          Body height         175 cm              MD Naomi Moore  
Work Phone:   
[phone]                          [facility]   
Ambulatory  
Work Phone:   
[phone]   
14:                              Body mass index   
(BMI) [Ratio]             32.3 kg/m2                MD Naomi Moore  
Work Phone:   
[phone]                          [facility]   
Ambulatory  
Work Phone:   
[phone]   
14:          Body temperature    98.4 [degF]         MD Naomi Moore  
Work Phone:   
[phone]                          [facility]   
Ambulatory  
Work Phone:   
[phone]   
14:          Body weight         99 kg               MD Naomi Moore  
Work Phone:   
[phone]                          [facility]   
Ambulatory  
Work Phone:   
[phone]   
14:                              Diastolic blood   
pressure                  86 mm[Hg]                 MD Naomi Moore  
Work Phone:   
[phone]                          [facility]   
Ambulatory  
Work Phone:   
[phone]   
14:          Heart rate          82 /min             MD Naomi Moore  
Work Phone:   
[phone]                          [facility]   
Ambulatory  
Work Phone:   
[phone]   
14:          Respiratory rate    18 /min             MD Naomi Moore  
Work Phone:   
[phone]                          [facility]   
Ambulatory  
Work Phone:   
[phone]   
14:                              SaO2% (BldA) [Mass   
fraction]                 99 %                      MD Naomi Moore  
Work Phone:   
[phone]                          [facility]   
Ambulatory  
Work Phone:   
[phone]  
   
                                                    2021   
14:                              Systolic blood   
pressure                  144 mm[Hg]                MD Naomi Moore  
Work Phone:   
[phone]                          [facility]   
Ambulatory  
Work Phone:   
[phone]  
   
                                                    2020   
08:      BP Diastolic    86 mm[Hg]       [place]  
   
                                                    [place]   
08:      BP Systolic     144 mm[Hg]      [place]  
   
                                                    2020   
08:      Pulse (Heart Rate) 82 /min         [place]  
   
                                                    2020   
08:      Pulse Oximetry  96 %            [place]  
   
                                                    2020   
08:      Respiratory Rate 16 /min         [facility]  
[place]  
   
                                                    2020   
07:                              BMI (Body Mass   
Index)              30.42 kg/m2         [place]  
   
                                                    2020   
07:      Body Temperature 98.29 [degF]    [place]  
   
                                                    2020   
07:      Body weight     93.44 kg        [place]  
   
                                                    2020   
07:      Height          175.3 cm        [place]  
  
  
  
Encounters  
  
  
                          Encounter Date Encounter Type Care Provider Facility  
   
                                                    Start: 10-  
End: 10-     ambulatory          Naomi Moore      Facility:[facility]  
   
                                        Start: 10-   Encounter for genera  
l   
adult medical   
examination without   
abnormal findings         Naomi Moore            [facility]  
   
                                                    Start: 10-  
End: 10-           ambulatory                MD Naomi Moore  
Work Phone:   
[phone]                          [facility]  
Work Phone:   
[phone]  
   
                                                    Start: 10-  
End: 10-                         Patient encounter   
procedure                               MD Naomi Moore  
Work Phone:   
[phone]                          [facility]   
Ctr (Acute)  
   
                                                    Start: 10-  
End: 10-     ambulatory          Naomi Shtoma      Facility:[facility]  
   
                                                    Start: 10-  
End: 10-           ambulatory                MD Naomi Moore  
Work Phone:   
[phone]                          [facility]   
Ambulatory  
Work Phone:   
[phone]  
   
                                                    Start: 10-  
End: 10-                         Patient encounter   
procedure                               MD Naomi Moore  
Work Phone:   
[phone]                          [facility]  
Work Phone:   
[phone]  
   
                                                    Start: 2023  
End: 2023     ambulatory          [facility]        [facility]  
   
                                                    Start: 2023  
End: 2023           ambulatory                MD Naomi Moore  
Work Phone:   
[phone]                          [facility]   
Ambulatory  
Work Phone:   
[phone]  
   
                                                    Start: 2023  
End: 2023                         Patient encounter   
procedure                               MD Naomi Moore  
Work Phone:   
[phone]                          [facility]  
   
                                                    Start: 2022  
End: 2022                         Patient encounter   
procedure                               MD Naomi Moore  
Work Phone:   
[phone]                          [facility]  
   
                                                    Start: 2022  
End: 2022                         Patient encounter   
procedure                               MD Naomi Moore  
Work Phone:   
[phone]                          [facility]  
   
                                Start: 2022 Non-patient / Non-visit MD Jesus Moore  
Work Phone:   
[phone]                          [facility]   
Ambulatory-Radiology   
[facility]  
   
                                                    Start: 2022  
End: 2022                         Patient encounter   
procedure                               MD Naomi Moore  
Work Phone:   
[phone]                          [facility] (Acute)  
   
                                                    Start: 2022  
End: 2022                         Patient encounter   
procedure                               MD Naomi Moore  
Work Phone:   
[phone]                          [facility]  
   
                                                    Start: 2021  
End: 2021                         Patient encounter   
procedure                               MD Naomi Moore  
Work Phone:   
[phone]                          [facility] (Acute)  
   
                                                    Start: 2021  
End: 2021                         Patient encounter   
procedure                               MD Naomi Moore  
Work Phone:   
[phone]                          [facility]  
   
                                                    Start: 2021  
End: 2021                         Patient encounter   
procedure                               MD Naomi Moore  
Work Phone:   
[phone]                          [facility] (Acute)  
   
                                                    Start: 2021  
End: 2021                         Patient encounter   
procedure                               MD Naomi Moore  
Work Phone:   
[phone]                          [facility]  
   
                                        Start: 10-   Patient encounter   
procedure                               MD Naomi Moore  
Work Phone:   
[phone]                          [facility]  
   
                                                    Start: 2020  
End: 2020                         Emergency department   
patient visit                           Moy Torres  
Work Phone:   
[phone]                          [facility]   
Department  
  
  
  
                                          
  
  
  
Procedures  
  
  
                          Date         Procedure    Procedure Detail Performing   
Clinician  
   
                                        Start: 2020   Ct abdomen & pelvis   
w/o   
contrast material                                   Moy Torres  
Work Phone:   
[phone]  
   
                          Start: 2020 Urinalysis microscopic only           
     Moy Torres  
Work Phone:   
[phone]  
   
                                        Start: 2020   Urnls dip stick/tabl  
et rgnt   
auto w/o microscopy                                 Moy Torres  
Work Phone:   
[phone]  
   
                                        Start: 2020   Blood count complete  
   
auto&auto difrntl wbc                               Moy Torres  
Work Phone:   
[phone]  
   
                                        Start: 2020   Comprehensive metabo  
lic   
panel                                               Moy Torres  
Work Phone:   
[phone]  
  
  
  
Plan of Treatment  
  
  
                          Date         Care Activity Detail       Author  
   
                          Start: 10-                           [facility]  
   
                          Start: 2019 Influenza vaccination Flu vaccine (#  
1) [place]  
   
                                        Start: 2008   DTaP/Tdap/Td vaccine  
 (1 -   
Tdap)                                   DTaP/Tdap/Td vaccine   
(1 - Tdap)                              [place]  
   
                          Start: 2004 HIV screen   HIV screen   [place], [place]  
   
                                        Start: 1990   Varicella vaccine (1  
 of 2   
- 2-dose childhood   
series)                                 Varicella vaccine (1   
of 2 - 2-dose   
childhood series)                       [place]  
   
                                                            25-hydroxyvitamin D3  
   
[Mass/volume] in Serum or   
Plasma                                              [facility]  
   
                                                            Cobalamin (Vitamin B  
12)   
[Mass/volume] in Serum or   
Plasma                                              [facility]  
   
                                                            Hepatitis B virus collado  
rface   
Ab [Units/volume] in   
Serum                                               [facility]  
   
                                       Patient Education              [facility]   
Ambulatory  
Work Phone:   
[phone]  
   
                                                            Testosterone   
[Mass/volume] in Serum or   
Plasma                                              [facility]  
   
                                                            Testosterone Free   
[Mass/volume] in Serum or   
Plasma                                              [facility]  
   
                                                            Thyrotropin   
[Units/volume] in Serum   
or Plasma                                           [facility]  
   
                                                            Urate [Mass/volume]   
in   
Serum or Plasma                                     [facility]  
  
  
  
Immunizations  
  
  
                      Immunization Date Immunization Notes      Care Provider Fa  
[facility]  
   
                                        2021          hepatitis B vaccine,  
   
adult dosage                                        MD Naomi Moore  
Work Phone:   
[phone]                          [facility]  
   
                                        2021          influenza, injectabl  
e,   
quadrivalent,   
preservative free                                   MD Naomi Moore  
Work Phone:   
[phone]                          [facility]  
   
                                        10-          influenza, injectabl  
e,   
quadrivalent,   
preservative free                                   MD Naomi Moore  
Work Phone:   
[phone]                          [facility]  
   
                                        10-          tetanus and diphther  
ia   
toxoids, not adsorbed,   
for adult use                                       MD aNomi Moore  
Work Phone:   
[phone]                          [facility]  
   
                                        2019          influenza, injectabl  
e,   
quadrivalent,   
preservative free                                   MD Naomi Moore  
Work Phone:   
[phone]                          [facility]  
   
                                        2009          tetanus toxoid, redu  
ivett   
diphtheria toxoid, and   
acellular pertussis   
vaccine, adsorbed                                   MD Naomi Moore  
Work Phone:   
[phone]                          [facility]  
   
                                        2007          meningococcal   
polysaccharide (groups A,   
C, Y and W-135)   
diphtheria toxoid   
conjugate vaccine (MCV4P)                           MD Naomi Moore  
Work Phone:   
[phone]                          [facility]  
   
                                        2000          measles, mumps and   
rubella virus vaccine                               MD Naomi Moore  
Work Phone:   
[phone]                          [facility]  
   
                          1995   varicella virus vaccine              MD ELENA Moore  
Work Phone:   
[phone]                          [facility]  
   
                                        1994          diphtheria, tetanus   
toxoids and acellular   
pertussis vaccine                                   MD Naomi Moore  
Work Phone:   
[phone]                          [facility]  
   
                                        1994          poliovirus vaccine,   
inactivated                                         MD Naomi Moore  
Work Phone:   
[phone]                          [facility]  
   
                                        1992          diphtheria, tetanus   
toxoids and acellular   
pertussis vaccine                                   MD Naomi Moore  
Work Phone:   
[phone]                          [facility]  
   
                                        1992          poliovirus vaccine,   
inactivated                                         MD Naomi Moore  
Work Phone:   
[phone]                          [facility]  
   
                                        10-          measles, mumps and   
rubella virus vaccine                               MD Naomi Moore  
Work Phone:   
[phone]                          [facility]  
   
                                        1990          diphtheria, tetanus   
toxoids and acellular   
pertussis vaccine                                   MD Naomi Moore  
Work Phone:   
[phone]                          [facility]  
   
                                        1990          poliovirus vaccine,   
inactivated                                         MD Naomi Moore  
Work Phone:   
[phone]                          [facility]  
   
                                        01-          diphtheria, tetanus   
toxoids and acellular   
pertussis vaccine                                   MD Naomi Moore  
Work Phone:   
[phone]                          [facility]  
   
                                        01-          poliovirus vaccine,   
inactivated                                         MD Naomi Moore  
Work Phone:   
[phone]                          [facility]  
   
                                        1989          diphtheria, tetanus   
toxoids and acellular   
pertussis vaccine                                   MD Naomi Moore  
Work Phone:   
[phone]                          [facility]  
   
                                        1989          poliovirus vaccine,   
inactivated                                         MD Naomi Moore  
Work Phone:   
[phone]                          [facility]  
  
  
  
Payers  
  
  
                          Date         Payer Category Payer        Policy ID  
   
                          2023   Self-pay                  [id number]  
[id number]  
[id number]  
   
                          2023   Unknown                   [id number]   
[id number]  
[id number]  
   
                                2020      Unknown         [employer] [place] [place]  
PO [address]   
3/31/2020-Present [address]   
[place] [address]                         
   
                                       Unknown                   [id number]   
[id number]  
[id number]  
   
                                       Unknown                   [id number]   
[id number]  
[id number]  
   
                                       Unknown                   [id number]   
[id number]  
[id number]  
  
  
  
Social History  
  
  
                          Date         Type         Detail       Facility  
   
                                        Start: 2020   Tobacco smoking stat  
us   
[facility]                      Never smoker              [place]  
   
                                Start: 2020 Alcohol intake  Lifetime non-d  
juan   
(finding)                               [place]  
   
                                        Start: 2020   History SDOH Alcohol  
   
Frequency                 1                         [place]  
   
                                       Sex Assigned At Birth Not on file  [place]  
   
                                                    Start: 2022  
End: 10-                         Tobacco smoking status   
[facility]                      Never smoker              [facility]  
   
                          Start: 1990 Sex Assigned At Birth Male         S  
[facility]  
  
  
  
Evaluation note  
  
  
                                Note Date & Type Note            Facility  
  
  
  
                                          
   
                                          
   
                                          
   
                                          
   
                                          
   
                                          
  
  
[facility]  
Work Phone: [phone]  
  
Evaluation note  
  
  
                                Note Date & Type Note            Facility  
  
  
  
                                          
   
                                          
   
                                          
   
                                          
   
                                          
   
                                          
   
                                          
  
  
[facility]  
Work Phone: [phone]  
  
Evaluation note  
  
  
                                Note Date & Type Note            Facility  
  
  
  
                                          
   
                                          
   
                                          
   
                                          
   
                                          
   
                                          
   
                                          
   
                                          
   
                                          
   
                                          
  
  
[facility]  
Work Phone: [phone]  
  
Progress note  
  
  
                                Note Date & Type Note            Facility  
  
  
  
                                          
   
                                          
   
                                          
   
                                          
  
[facility]  
Work Phone: [phone]  
  
Summary Purpose  
  
  
                                                      
  
  
  
Family History  
No Family History Records Found  
  
                          Relationship Condition    Age at Onset Recorded Date/T  
damien  
   
                          Not Specified Malignant neoplasm of prostate Unknown    
      
   
                                       Hypertension Unknown        
   
                                       Dementia     Unknown        
   
                          Not Specified Hypertension Unknown        
   
                                       Hypercholesterolemia Unknown        
   
                                       Diabetes mellitus Unknown        
   
                          Not Specified Hypercholesterolemia Unknown        
   
                                       Coronary artery disease Unknown        
   
                          Not Specified Seizures     Unknown        
   
                          Not Specified Dementia     Unknown        
  
  
  
                          Relationship Condition    Age at Onset Recorded Date/T  
damien  
   
                          Not Specified Malignant neoplasm of prostate Unknown    
      
   
                                       Hypertension Unknown        
   
                                       Dementia     Unknown        
   
                          Not Specified Hypertension Unknown        
   
                                       Hypercholesterolemia Unknown        
   
                                       Diabetes mellitus Unknown        
   
                          Not Specified Hypercholesterolemia Unknown        
   
                                       Coronary artery disease Unknown        
   
                          Not Specified Seizures     Unknown        
   
                          Not Specified Dementia     Unknown        
  
  
  
                          Relationship Condition    Age at Onset Recorded Date/T  
damien  
   
                          Not Specified Malignant neoplasm of prostate Unknown    
      
   
                                       Hypertension Unknown        
   
                                       Dementia     Unknown        
   
                          Not Specified Hypertension Unknown        
   
                                       Hypercholesterolemia Unknown        
   
                                       Diabetes mellitus Unknown        
   
                                       Hyponatremia Unknown        
   
                          Not Specified Hypercholesterolemia Unknown        
   
                                       Coronary artery disease Unknown        
   
                          Not Specified Seizures     Unknown        
   
                          Not Specified Dementia     Unknown        
  
  
  
Advance Directives  
No Advanced Directives Records FoundDocuments on File  
  
                          Type         Date Recorded Patient Representative Expl  
anation  
   
                          Advance Directives and Living Will                      
         
   
                          Power of                              
  
  
  
                                Advance Directive Response        Recorded Date/  
Time  
   
                                Advance Directives No              May 27th, [birth year]  
2 8:16am  
  
  
  
                                Advance Directive Response        Recorded Date/  
Time  
   
                                Advance Directives No              2023 8:33am  
  
  
  
                                Advance Directive Response        Recorded Date/  
Time  
   
                                Advance Directives No              2023 9:33am  
  
  
  
Discharge Instructions  
* Instructions*   
  
Moy Torres MD - 2020  
  
  
  
Drink 10-12 extra glasses of fluid today  
Take Flomax once a day till pain-free  
Take Tylenol or Vicodin (do not take both)  
Take ibuprofen 600 mg 3 times a day  
Take Zofran for nausea and vomiting  
Follow-up with the urologist if not better in 48 to 72 hours  
  
  
  
  
* Attachments  
  
The following attachments cannot be sent through Care Everywhere.  
  
* Kidney Stone (English)  
  
documented in this encounter  
  
Assessments  
  
  
                                                    Diagnosis  
   
                                                      
  
  
Kidney stone  
  
  
Calculus of kidney  
  
  
  
Chief Complaint and Reason for Visit  
  
  
                                        Chief Complaint     Dizzy spells  
LABS  
Yearly  
LAB  
cough  
Muscle aches  
   
                                        Reason for Visit    Allergic rhinitis  
Annual physical exam  
Hypoglycemia  
History of kidney stones  
  
  
  
                                        Chief Complaint     cough  
Muscle aches  
eye irritation  
sinus congestion  
   
                                        Reason for Visit    Cough  
Otitis media  
Muscle ache  
Allergic eye reaction  
Bacterial conjunctivitis of right eye  
  
  
  
                                        Chief Complaint     sinus congestion  
PYV  
   
                                        Reason for Visit    Allergic rhinitis  
Annual physical exam  
Erectile dysfunction  
Hypoglycemia  
Allergic rhinitis  
GERD (gastroesophageal reflux disease)  
History of kidney stones  
Obesity  
  
  
  
                                        Chief Complaint     sinus congestion  
PYV  
Encounter for annual physical exam Allergic rhinit  
   
                                        Reason for Visit    Allergic rhinitis  
Annual physical exam  
Erectile dysfunction  
Hypoglycemia  
Allergic rhinitis  
GERD (gastroesophageal reflux disease)  
History of kidney stones  
Obesity  
  
  
  
Additional Source Comments  
  
  
  
                                                    PREGNANCY (unrecognized sect  
ion and content)  
   
                                                    No Pregnancy Status Records   
FoundNo Pregnancy Status Records Found  
  
  
  
  
                                                    INFORMATION SOURCE (unrecogn  
ized section and content)  
   
                                          
  
  
  
                                          
   
                                          
  
  
  
                                DATE CREATED    AUTHOR          AUTHOR'S ORGANIZ  
ATION  
   
                                2023                      [facility]  
  
  
  
  
  
                                                    Reason for Visit (unrecogniz  
ed section and content)  
   
                                          
  
  
  
                                          
   
                                          
  
  
  
  
  
                                                    Goals (unrecognized section   
and content)  
   
                                                    Goals may be documented in a  
n alternate sectionGoals may be documented in an   
alternate sectionGoals may be documented in an alternate sectionGoals may be   
documented in an alternate section  
  
  
  
  
                                                    Care Teams (unrecognized sec  
tion and content)  
   
                                          
  
  
  
                                          
   
                                          
   
                                          
  
  
  
                                                    Team Status: Inactive   
   
                          Member       Role         Status       Dates  
   
                          Naomi Moore MD Primary Care Provider Active        
   
   
                          Hamzah Britton NP Attending Provider Active         
  
  
  
                                                    Team Status: Inactive   
   
                          Member       Role         Status       Dates  
   
                          Naomi Moore MD Primary Care Provider, Attending IVORY dawson Active         
  
  
FOR RECORDS PERTAINING TO PATIENTS WHO ARE OR HAVE BEEN ENROLLED IN A CHEMICAL 
DEPENDENCY/SUBSTANCEABUSE PROGRAM, SOME INFORMATION MAY BE OMITTED. This 
clinical summary was aggregated from multiple sources. Caution should be 
exercised in using it in the provision of clinical care. This summary normalizes
 information from multiple sources, and as a consequence, information in this 
document may materially change the coding, format and clinical context of 
patient data. In addition, data may be omitted in some cases. CLINICAL DECISIONS
 SHOULD BE BASED ON THE PRIMARY CLINICAL RECORDS. [facility] [employer] provides
 no warranty or guarantee of the accuracy or completeness of information in this
 document.

## 2023-12-31 NOTE — [id number]
HPI
History of Present Illness
Chief Complaint: Abd Pain
Informant: patient
Onset/Context/Timing
Onset: Today
Narrative
Narrative: 
Patient present secondary to upper abdominal pain.  He states he woke this morning with tightness across his upper abdomen.  He had some mild nausea but no vomiting.  He has not eaten anything today.  He had a loose bowel movement yesterday morning 
but then normal after that.  No fever or chills.  States he felt well when he went to bed last evening.

[facility]
Medical History (Updated 12/31/23 @ 09:40 by Dr. Sabrina Mims MD)

Seasonal allergies


Home Medications

dicyclomine 20 mg tablet 20 mg PO BID PRN abdominal pain #20 tabs 12/31/23 [Rx Last Taken Unknown]




Allergy/AdvReac Type Severity Reaction Status Date / Time
Penicillins Allergy Mild Rash Verified 12/31/23 08:32


Social History (Reviewed 12/31/23 @ 08:45 by Dr. Sabrina Mims MD)
Smoking Status:  Never smoker 



ROS
ROS ED
Constitutional
Constitutional ED: Denies chills or fever(s)
Eyes
Eyes: Denies discharge from eye(s)
ENT
ENT ED: Denies discharge from eye(s), rhinorrhea or sore throat
Cardiovascular
Cardiovascular: Denies chest pain or palpitations
Respiratory/Chest
Respiratory/Chest: Denies cough or dyspnea
Gastrointestinal
Gastrointestinal: Reports abdominal pain and nausea; Denies diarrhea or vomiting
Genitourinary
Genitourinary ED: Denies dysuria
Musculoskeletal
Musculoskeletal: Denies back pain or extremity pain
Integumentary
Denies Abrasions or rash
Neurologic
Neurologic: Denies headache(s) or weakness
Psychiatric
Psychiatric: Denies anxiety or depression
Allergic/Immunologic
Allergic/Immunologic ED: Denies lip swelling or urticaria

EXAM
Physical Exam
Const
Vital Signs: 



 12/31/23
08:31
Temperature 97.6 F L
Temperature Source Temporal
Pulse Rate 74
Respiratory Rate 14
Blood Pressure 141/96 H
Blood Pressure Mean 111
Pulse Ox 98
Oxygen Delivery Method Room Air



Positive well nourished and well developed
General Appearance ED: well developed
HEENT
Reports normocephalic and head/scalp atraumatic
Eyes
PERRL and EOMs intact bilaterally
Neck
supple
Chest Wall
inspection of chest normal and palpation of chest normal
Resp
normal respiratory effort and clear to auscultation bilaterally
Cardio
regular rate and regular rhythm
GI
GI Narrative: 
Minimal tenderness in the epigastrium.  No guarding or rebound.  Hypoactive but present bowel sounds.
Palpation: soft
Extremity
normal to inspection
Neuro
oriented x3 and no sensory deficits noted
Sensorium / Orientation: alert
Motor Exam: strength 5/5 throughout
Psych
mental status grossly normal
Skin
no rashes or lesions noted

MDM
MDM
MDM Narrative
Medical decision making narrative: 
Patient placed on cardiac monitor.  IV line established.  EKG obtained to evaluate for cardiac arrhythmia/ischemia.  Labwork obtained to evaluate for leukocytosis, anemia, and electrolyte derangement.  Patient given Protonix and Bentyl.
Lab Data
Attestation: I reviewed the patient's lab results.
Labs: 

Laboratory Results - last 24 hr

  12/31/23
  08:56
WBC  7.9
RBC  5.28
Hgb  16.0
Hct  45.7
MCV  86.6
MCH  30.3
MCHC  35.0
RDW Std Deviation  41.5
RDW Coeff of Wilmar  13.2
Plt Count  301
MPV  8.9
Immature Gran % (Auto)  0.300
Neut % (Auto)  56.6
Lymph % (Auto)  30.1
Mono % (Auto)  8.2
Eos % (Auto)  4.2
Baso % (Auto)  0.6
Absolute Neuts (auto)  4.5
Absolute Lymphs (auto)  2.38
Nucleated RBC %  0
Sodium  139
Potassium  4.1
Chloride  111 H
Carbon Dioxide  25.0
Anion Gap  3 L
BUN  12
Creatinine  0.99
Estim Creat Clear Calc  105.14
Est GFR (MDRD) Af Amer  111
Est GFR (MDRD) Non-Af  92
BUN/Creatinine Ratio  12.1
Glucose  97
Calcium  8.9
Total Bilirubin  0.20
Direct Bilirubin  0.08
AST  31
ALT  67 H
Alkaline Phosphatase  56
Troponin I High Sens  4
Total Protein  7.5
Albumin  3.6
Globulin  3.9
Lipase  30



EKG
Initial EKG: 
      Attestation: I personally reviewed and interpreted this EKG as follows:
      Interpretation: Sinus Rhythm (Sinus at 65 with no acute ischemia.)
Treatment and Re-Evaluation
:: 
CBC is unremarkable with a white count of 7.9 and normal differential.  Hemoglobin is 16.  Chemistry studies unremarkable.  Glucose is 97.  LFTs and lipase normal other than ALT slightly bumped at 67.  EKG is sinus rhythm with no evidence of acute 
ischemia.  Troponin was normal at 4.
On repeat evaluation patient resting more comfortably.  We did discuss starting an antacid medicine.  He states he has omeprazole at home that he had taken previously.  He states he had stopped drinking pop and felt that he did not need it anymore.  
He does admit to increase soda consumption recently.  I will also write him a paper prescription for Bentyl.  If he is still having cramping after restarting his omeprazole he can get that filled to help with symptoms as well.  He and wife are 
comfortable with the plan.  Return instructions given.

Discharge Plan
Triage
Chief Complaint: Abd Pain

ED Provider: Sabrina Mims

Dx/Rx/DC Orders
Clinical Impression:
 Abdominal pain, epigastric


Instructions:  ED Epigastric Pain Uncertain Cause

Prescriptions:
New
  dicyclomine 20 mg tablet 
   20 mg PO BID PRN (Reason: abdominal pain) Qty: 20 0RF

Primary Care Provider: Naomi Moore MD

Referrals:
Naomi Moore MD [Other] - 1 Week if not improving

Disposition
***Disposition***: Home, Self Care

## 2023-12-31 NOTE — EKG12_ITS
Test Reason : ABL PAIN 
Blood Pressure : ***/*** mmHG 
Vent. Rate : 065 BPM     Atrial Rate : 065 BPM 
   P-R Int : 164 ms          QRS Dur : 088 ms 
    QT Int : 394 ms       P-R-T Axes : 030 019 022 degrees 
   QTc Int : 409 ms 
  
Normal sinus rhythm 
Normal ECG 
  
Confirmed by JASMEET MARTIN MD ([id number]),  ALESIA RENE ([id number]) on 1/2/2024 8:34:20 AM 
  
Referred By:  MARLA           Confirmed By:JASMEET MARTIN MD

## 2023-12-31 NOTE — EDS_ITS
HPI    
History of Present Illness    
Chief Complaint: Abd Pain    
Informant: patient    
Onset/Context/Timing    
Onset: Today    
Narrative    
Narrative:     
Patient present secondary to upper abdominal pain.  He states he woke this   
morning with tightness across his upper abdomen.  He had some mild nausea but no  
vomiting.  He has not eaten anything today.  He had a loose bowel movement   
yesterday morning but then normal after that.  No fever or chills.  States he   
felt well when he went to bed last evening.    
    
[facility]    
Medical History (Updated 12/31/23 @ 09:40 by Dr. Sabrina Mims MD)    
    
Seasonal allergies    
    
    
                                Home Medications    
    
dicyclomine 20 mg tablet 20 mg PO BID PRN abdominal pain #20 tabs 12/31/23 [Rx   
Last Taken Unknown]    
    
    
                                            
    
    
    
Allergy/AdvReac Type Severity Reaction Status Date / Time    
     
Penicillins Allergy Mild Rash Verified 12/31/23 08:32    
    
    
    
Social History (Reviewed 12/31/23 @ 08:45 by Dr. Sabrina Mims MD)    
Smoking Status:  Never smoker     
    
    
    
ROS    
ROS ED    
Constitutional    
Constitutional ED: Denies chills or fever(s)    
Eyes    
Eyes: Denies discharge from eye(s)    
ENT    
ENT ED: Denies discharge from eye(s), rhinorrhea or sore throat    
Cardiovascular    
Cardiovascular: Denies chest pain or palpitations    
Respiratory/Chest    
Respiratory/Chest: Denies cough or dyspnea    
Gastrointestinal    
Gastrointestinal: Reports abdominal pain and nausea; Denies diarrhea or vomiting    
Genitourinary    
Genitourinary ED: Denies dysuria    
Musculoskeletal    
Musculoskeletal: Denies back pain or extremity pain    
Integumentary    
Denies Abrasions or rash    
Neurologic    
Neurologic: Denies headache(s) or weakness    
Psychiatric    
Psychiatric: Denies anxiety or depression    
Allergic/Immunologic    
Allergic/Immunologic ED: Denies lip swelling or urticaria    
    
EXAM    
Physical Exam    
Const    
Vital Signs:     
    
                                            
    
    
    
 12/31/23    
08:31    
     
Temperature 97.6 F L    
     
Temperature Source Temporal    
     
Pulse Rate 74    
     
Respiratory Rate 14    
     
Blood Pressure 141/96 H    
     
Blood Pressure Mean 111    
     
Pulse Ox 98    
     
Oxygen Delivery Method Room Air    
    
    
    
    
Positive well nourished and well developed    
General Appearance ED: well developed    
HEENT    
Reports normocephalic and head/scalp atraumatic    
Eyes    
PERRL and EOMs intact bilaterally    
Neck    
supple    
Chest Wall    
inspection of chest normal and palpation of chest normal    
Resp    
normal respiratory effort and clear to auscultation bilaterally    
Cardio    
regular rate and regular rhythm    
GI    
GI Narrative:     
Minimal tenderness in the epigastrium.  No guarding or rebound.  Hypoactive but   
present bowel sounds.    
Palpation: soft    
Extremity    
normal to inspection    
Neuro    
oriented x3 and no sensory deficits noted    
Sensorium / Orientation: alert    
Motor Exam: strength 5/5 throughout    
Psych    
mental status grossly normal    
Skin    
no rashes or lesions noted    
    
MDM    
MDM    
MDM Narrative    
Medical decision making narrative:     
Patient placed on cardiac monitor.  IV line established.  EKG obtained to   
evaluate for cardiac arrhythmia/ischemia.  Labwork obtained to evaluate for   
leukocytosis, anemia, and electrolyte derangement.  Patient given Protonix and   
Bentyl.    
Lab Data    
Attestation: I reviewed the patient's lab results.    
Labs:     
    
                         Laboratory Results - last 24 hr    
    
    
    
  12/31/23    
    
  08:56    
     
WBC  7.9    
     
RBC  5.28    
     
Hgb  16.0    
     
Hct  45.7    
     
MCV  86.6    
     
MCH  30.3    
     
MCHC  35.0    
     
RDW Std Deviation  41.5    
     
RDW Coeff of Wilmar  13.2    
     
Plt Count  301    
     
MPV  8.9    
     
Immature Gran % (Auto)  0.300    
     
Neut % (Auto)  56.6    
     
Lymph % (Auto)  30.1    
     
Mono % (Auto)  8.2    
     
Eos % (Auto)  4.2    
     
Baso % (Auto)  0.6    
     
Absolute Neuts (auto)  4.5    
     
Absolute Lymphs (auto)  2.38    
     
Nucleated RBC %  0    
     
Sodium  139    
     
Potassium  4.1    
     
Chloride  111 H    
     
Carbon Dioxide  25.0    
     
Anion Gap  3 L    
     
BUN  12    
     
Creatinine  0.99    
     
Estim Creat Clear Calc  105.14    
     
Est GFR (MDRD) Af Amer  111    
     
Est GFR (MDRD) Non-Af  92    
     
BUN/Creatinine Ratio  12.1    
     
Glucose  97    
     
Calcium  8.9    
     
Total Bilirubin  0.20    
     
Direct Bilirubin  0.08    
     
AST  31    
     
ALT  67 H    
     
Alkaline Phosphatase  56    
     
Troponin I High Sens  4    
     
Total Protein  7.5    
     
Albumin  3.6    
     
Globulin  3.9    
     
Lipase  30    
    
    
    
    
EKG    
Initial EKG:     
      Attestation: I personally reviewed and interpreted this EKG as follows:    
      Interpretation: Sinus Rhythm (Sinus at 65 with no acute ischemia.)    
Treatment and Re-Evaluation    
::     
CBC is unremarkable with a white count of 7.9 and normal differential.    
Hemoglobin is 16.  Chemistry studies unremarkable.  Glucose is 97.  LFTs and   
lipase normal other than ALT slightly bumped at 67.  EKG is sinus rhythm with no  
evidence of acute ischemia.  Troponin was normal at 4.    
On repeat evaluation patient resting more comfortably.  We did discuss starting   
an antacid medicine.  He states he has omeprazole at home that he had taken   
previously.  He states he had stopped drinking pop and felt that he did not need  
it anymore.  He does admit to increase soda consumption recently.  I will also   
write him a paper prescription for Bentyl.  If he is still having cramping after  
restarting his omeprazole he can get that filled to help with symptoms as well.   
He and wife are comfortable with the plan.  Return instructions given.    
    
Discharge Plan    
Triage    
Chief Complaint: Abd Pain    
    
ED Provider: Sabrina Mims    
    
Dx/Rx/DC Orders    
Clinical Impression:    
 Abdominal pain, epigastric    
    
    
Instructions:  ED Epigastric Pain Uncertain Cause    
    
Prescriptions:    
New    
  dicyclomine 20 mg tablet     
   20 mg PO BID PRN (Reason: abdominal pain) Qty: 20 0RF    
    
Primary Care Provider: Naomi Moore MD    
    
Referrals:    
Naomi Moore MD [Other] - 1 Week if not improving    
    
Disposition    
***Disposition***: Home, Self Care